# Patient Record
Sex: FEMALE | URBAN - METROPOLITAN AREA
[De-identification: names, ages, dates, MRNs, and addresses within clinical notes are randomized per-mention and may not be internally consistent; named-entity substitution may affect disease eponyms.]

---

## 2024-01-26 ENCOUNTER — HOSPITAL ENCOUNTER (OUTPATIENT)
Dept: TELEMEDICINE | Facility: HOSPITAL | Age: 74
Discharge: HOME | End: 2024-01-26

## 2024-01-27 ENCOUNTER — TELEMEDICINE (OUTPATIENT)
Dept: NEUROLOGY | Facility: HOSPITAL | Age: 74
End: 2024-01-27

## 2024-01-27 DIAGNOSIS — I63.412 EMBOLIC STROKE INVOLVING LEFT MIDDLE CEREBRAL ARTERY: Primary | ICD-10-CM

## 2024-01-27 PROCEDURE — G0427 INPT/ED TELECONSULT70: HCPCS | Mod: GT,,, | Performed by: PSYCHIATRY & NEUROLOGY

## 2024-01-27 NOTE — TELEMEDICINE CONSULT
[facility]  Neurology Department  Vascular Neurology      Reason for Consult (Chief Complaint): neurologic symptoms    SUBJECTIVE:     History of Present Illness:  Patient is a 73 y.o. female who was admitted for episode of sudden onset difficulty speaking and understanding others.  No associated symptoms like weakness.    Symptoms lasted about 10 minutes and resolved without any treatment. She is back at her baseline. Has not had any previous episodes or recurrance.  No triggers.    Past Medical History:   Diagnosis Date    Hypertension     Paroxysmal atrial fibrillation      Past Surgical History:   Procedure Laterality Date    NASAL FRACTURE SURGERY       Family History   Problem Relation Age of Onset    Hyperlipidemia Son         Review of patient's allergies indicates:  No Known Allergies    Medications:   I have reviewed the current medication administration record.  For a complete list of medications please see the medication list.    Review of Systems:  Palpitations  No SOB    OBJECTIVE:     Vital Signs (Most Recent):  There were no vitals taken for this visit.    Physical Exam:  Awake and alert.  Moves all ext without weakness.  EOMI    Laboratory:  No relevant abnormalities.    Diagnostic Results:  Brain Imaging:   Small punctate strokes in the L frontal lobe  Normal vasculature      ASSESSMENT/PLAN:     Diagnosis:  Embolic stroke to L MCA  Off anticoagulation for 2 days.    Restart xarelto or equivalent drug ASAP.    Follow up w [facility] neurology (virtual visit OK) in 2 weeks.        Joey Flores MD  Vascular and Interventional Neurology  , Department of Neurology  Section Head, Vascular Neurology  Departments of Neurology, Neurosurgery and Radiology  [facility] [place], [place]

## 2024-02-06 ENCOUNTER — TELEPHONE (OUTPATIENT)
Dept: NEUROLOGY | Facility: CLINIC | Age: 74
End: 2024-02-06

## 2024-02-06 NOTE — TELEPHONE ENCOUNTER
----- Message from Joey Flores MD sent at 1/28/2024 10:00 AM CST -----  Follow up in clinic w stroke team (any doc). Virtual - they live in [place].

## 2024-02-06 NOTE — TELEPHONE ENCOUNTER
Attempted to contact patient to schedule stroke virtual follow up.   Unable to leave voice mail due to voice mail not set up.     Will schedule stroke follow up with Lisbeth Byrd NP and send appointment letter in the mail.   4 week follow up from 1/27 will be scheduled         ----- Message from Joey Flores MD sent at 1/28/2024 10:00 AM CST -----  Follow up in clinic w stroke team (any doc). Virtual - they live in [place]